# Patient Record
Sex: MALE | Race: WHITE | Employment: PART TIME | URBAN - METROPOLITAN AREA
[De-identification: names, ages, dates, MRNs, and addresses within clinical notes are randomized per-mention and may not be internally consistent; named-entity substitution may affect disease eponyms.]

---

## 2017-01-04 ENCOUNTER — OFFICE VISIT (OUTPATIENT)
Dept: FAMILY MEDICINE CLINIC | Age: 27
End: 2017-01-04

## 2017-01-04 VITALS
WEIGHT: 190 LBS | RESPIRATION RATE: 18 BRPM | TEMPERATURE: 98.2 F | SYSTOLIC BLOOD PRESSURE: 120 MMHG | HEIGHT: 73 IN | OXYGEN SATURATION: 100 % | DIASTOLIC BLOOD PRESSURE: 80 MMHG | BODY MASS INDEX: 25.18 KG/M2 | HEART RATE: 100 BPM

## 2017-01-04 DIAGNOSIS — F90.2 ADHD (ATTENTION DEFICIT HYPERACTIVITY DISORDER), COMBINED TYPE: ICD-10-CM

## 2017-01-04 DIAGNOSIS — Z51.81 MEDICATION MONITORING ENCOUNTER: ICD-10-CM

## 2017-01-04 DIAGNOSIS — R05.9 COUGH: Primary | ICD-10-CM

## 2017-01-04 RX ORDER — BENZONATATE 200 MG/1
200 CAPSULE ORAL
Qty: 30 CAP | Refills: 0 | Status: SHIPPED | OUTPATIENT
Start: 2017-01-04

## 2017-01-04 NOTE — MR AVS SNAPSHOT
Visit Information Date & Time Provider Department Dept. Phone Encounter #  
 1/4/2017  1:45 PM [address] [facility], [address] [phone] [id number] Follow-up Instructions Return in about 3 months (around 4/4/2017), or if symptoms worsen or fail to improve. Upcoming Health Maintenance Date Due Pneumococcal 19-64 Medium Risk (1 of 1 - PPSV23) 4/27/2011 DTaP/Tdap/Td series (2 - Td) 7/25/2016 INFLUENZA AGE 9 TO ADULT 8/1/2016 Allergies as of 1/4/2017  Review Complete On: 11/3/2016 By: Raphael Miranda NP No Known Allergies Current Immunizations  Never Reviewed Name Date Meningococcal (MCV4) Vaccine 10/14/2010 Tdap 7/25/2006 Not reviewed this visit You Were Diagnosed With   
  
 Codes Comments Cough    -  Primary ICD-10-CM: A36 ICD-9-CM: 786.2 ADHD (attention deficit hyperactivity disorder), combined type     ICD-10-CM: F90.2 ICD-9-CM: 314.01 Vitals BP Pulse Temp Resp Height(growth percentile) Weight(growth percentile) 120/80 100 98.2 °F (36.8 °C) (Oral) 18 6' 1\" (1.854 m) 190 lb (86.2 kg) SpO2 BMI Smoking Status 100% 25.07 kg/m2 Current Every Day Smoker Vitals History BMI and BSA Data Body Mass Index Body Surface Area 25.07 kg/m 2 2.11 m 2 Preferred Pharmacy Pharmacy Name Phone [facility] #[id number]- [place], [place] - [address] [facility] [address] [facility] [phone] Your Updated Medication List  
  
   
This list is accurate as of: 1/4/17  2:14 PM.  Always use your most recent med list.  
  
  
  
  
 benzonatate 200 mg capsule Commonly known as:  TESSALON Take 1 Cap by mouth three (3) times daily as needed for Cough. * lisdexamfetamine 30 mg capsule Commonly known as:  VYVANSE Take 1 Cap by mouth every morning. Max Daily Amount: 30 mg.  
  
 * lisdexamfetamine 30 mg capsule Commonly known as:  VYVANSE  
 Take 1 Cap by mouth every morning. Max Daily Amount: 30 mg. DO NOT FILL UNTIL 2/4/17 * lisdexamfetamine 30 mg capsule Commonly known as:  VYVANSE Take 1 Cap by mouth every morning. Max Daily Amount: 30 mg. DO NOT FILL UNTIL 3/4/17 * Notice: This list has 3 medication(s) that are the same as other medications prescribed for you. Read the directions carefully, and ask your doctor or other care provider to review them with you. Prescriptions Printed Refills  
 lisdexamfetamine (VYVANSE) 30 mg capsule 0 Sig: Take 1 Cap by mouth every morning. Max Daily Amount: 30 mg.  
 Class: Print Route: Oral  
 lisdexamfetamine (VYVANSE) 30 mg capsule 0 Sig: Take 1 Cap by mouth every morning. Max Daily Amount: 30 mg. DO NOT FILL UNTIL 2/4/17 Class: Print Route: Oral  
 lisdexamfetamine (VYVANSE) 30 mg capsule 0 Sig: Take 1 Cap by mouth every morning. Max Daily Amount: 30 mg. DO NOT FILL UNTIL 3/4/17 Class: Print Route: Oral  
  
Prescriptions Sent to Pharmacy Refills  
 benzonatate (TESSALON) 200 mg capsule 0 Sig: Take 1 Cap by mouth three (3) times daily as needed for Cough. Class: Normal  
 Pharmacy: [facility]/pharmacy #[id number] #: [phone] Route: Oral  
  
Follow-up Instructions Return in about 3 months (around 4/4/2017), or if symptoms worsen or fail to improve. Patient Instructions Attention Deficit Hyperactivity Disorder (ADHD) in Adults: Care Instructions Your Care Instructions Attention deficit hyperactivity disorder, or ADHD, is a condition that makes it hard to pay attention. So you may have problems when you try to focus, get organized, and finish tasks. It might make you more active than other people. Or you might do things without thinking first. 
ADHD is very common. It usually starts in early childhood.  Many adults don't realize they have it until their children are diagnosed. Then they become aware of their own symptoms. Doctors don't know what causes ADHD. But it often runs in families. ADHD can be treated with medicines, behavior training, and counseling. Treatment can improve your life. Follow-up care is a key part of your treatment and safety. Be sure to make and go to all appointments, and call your doctor if you are having problems. It's also a good idea to know your test results and keep a list of the medicines you take. How can you care for yourself at home? · Learn all you can about ADHD. This will help you and your family understand it better. · Take your medicines exactly as prescribed. Call your doctor if you think you are having a problem with your medicine. You will get more details on the specific medicines your doctor prescribes. · If you miss a dose of your medicine, do not take an extra dose. · If your doctor suggests counseling, find a counselor you like and trust. Talk openly and honestly. Be willing to make some changes. · Find a support group for adults with ADHD. Talking to others with the same problems can help you feel better. It can also give you ideas about how to best cope with the condition. · Get rid of distractions at your work space. Keep your desk clean. Try not to face a window or busy hallway. · Use files, planners, and other tools to keep you organized. · Limit use of alcohol, and do not use illegal drugs. People with ADHD tend to become addicted more easily than others. Tell your doctor if you need help to quit. Counseling, support groups, and sometimes medicines can help you stay free of alcohol or drugs. · Get at least 30 minutes of physical activity on most days of the week. Exercise has been shown to help people cope with ADHD. Walking is a good choice. You also may want to do other activities, such as running, swimming, cycling, or playing tennis or team sports. When should you call for help? Watch closely for changes in your health, and be sure to contact your doctor if: 
· You feel sad a lot or cry all the time. · You have trouble sleeping, or you sleep too much. · You find it hard to concentrate, make decisions, or remember things. · You change how you normally eat. · You feel guilty for no reason. Where can you learn more? Go to http://kaylan-lila.info/. Enter B196 in the search box to learn more about \"Attention Deficit Hyperactivity Disorder (ADHD) in Adults: Care Instructions. \" Current as of: July 26, 2016 Content Version: 11.1 © 8214-5705 [employer]. Care instructions adapted under license by [employer] (which disclaims liability or warranty for this information). If you have questions about a medical condition or this instruction, always ask your healthcare professional. [address] any warranty or liability for your use of this information. Introducing [facility]! Reynaldo Barton introduces weave energy patient portal. Now you can access parts of your medical record, email your doctor's office, and request medication refills online. 1. In your internet browser, go to [email or web address] 2. Click on the First Time User? Click Here link in the Sign In box. You will see the New Member Sign Up page. 3. Enter your weave energy Access Code exactly as it appears below. You will not need to use this code after youve completed the sign-up process. If you do not sign up before the expiration date, you must request a new code. · weave energy Access Code: [email or web address] Expires: 4/4/2017  2:14 PM 
 
4. Enter the last four digits of your Social Security Number (xxxx) and Date of Birth (mm/dd/yyyy) as indicated and click Submit. You will be taken to the next sign-up page. 5. Create a weave energy ID.  This will be your weave energy login ID and cannot be changed, so think of one that is secure and easy to remember. 6. Create a [facility] [employer] password. You can change your password at any time. 7. Enter your Password Reset Question and Answer. This can be used at a later time if you forget your password. 8. Enter your e-mail address. You will receive e-mail notification when new information is available in [address] 9. Click Sign Up. You can now view and download portions of your medical record. 10. Click the Download Summary menu link to download a portable copy of your medical information. If you have questions, please visit the Frequently Asked Questions section of the [employer] website. Remember, [employer] is NOT to be used for urgent needs. For medical emergencies, dial 911. Now available from your iPhone and Android! Please provide this summary of care documentation to your next provider. Your primary care clinician is listed as Cami Cota. If you have any questions after today's visit, please call [phone].

## 2017-01-04 NOTE — PROGRESS NOTES
Carlos Monreal is a 27 y.o. male   Chief Complaint   Patient presents with    Medication Refill    pt here for f/u for his vyvanse, pt is doing well with it. Pt states he is not having any side effects and feels much more on track. Pt also with a cold last week and states that he has a residual cough but is feeling better. Chief Complaint   Patient presents with    Medication Refill     he is a 27y.o. year old male who presents for evalution. Reviewed PmHx, RxHx, FmHx, SocHx, AllgHx and updated and dated in the chart. Review of Systems - negative except as listed above in the HPI    Objective:     Vitals:    01/04/17 1344   BP: 120/80   Pulse: 100   Resp: 18   Temp: 98.2 °F (36.8 °C)   TempSrc: Oral   SpO2: 100%   Weight: 190 lb (86.2 kg)   Height: 6' 1\" (1.854 m)       Current Outpatient Prescriptions   Medication Sig    lisdexamfetamine (VYVANSE) 30 mg capsule Take 1 Cap by mouth every morning. Max Daily Amount: 30 mg.    lisdexamfetamine (VYVANSE) 30 mg capsule Take 1 Cap by mouth every morning. Max Daily Amount: 30 mg. DO NOT FILL UNTIL 2/4/17    lisdexamfetamine (VYVANSE) 30 mg capsule Take 1 Cap by mouth every morning. Max Daily Amount: 30 mg. DO NOT FILL UNTIL 3/4/17    benzonatate (TESSALON) 200 mg capsule Take 1 Cap by mouth three (3) times daily as needed for Cough. No current facility-administered medications for this visit. Physical Examination: General appearance - alert, well appearing, and in no distress  Mental status - alert, oriented to person, place, and time  Eyes - pupils equal and reactive, extraocular eye movements intact  Chest - clear to auscultation, no wheezes, rales or rhonchi, symmetric air entry  Heart - normal rate, regular rhythm, normal S1, S2, no murmurs, rubs, clicks or gallops      Assessment/ Plan:   Luca Mcmanus was seen today for medication refill. Diagnoses and all orders for this visit:    Cough  -     benzonatate (TESSALON) 200 mg capsule;  Take 1 Cap by mouth three (3) times daily as needed for Cough. ADHD (attention deficit hyperactivity disorder), combined type  -     lisdexamfetamine (VYVANSE) 30 mg capsule; Take 1 Cap by mouth every morning. Max Daily Amount: 30 mg.  -     lisdexamfetamine (VYVANSE) 30 mg capsule; Take 1 Cap by mouth every morning. Max Daily Amount: 30 mg. DO NOT FILL UNTIL 2/4/17  -     lisdexamfetamine (VYVANSE) 30 mg capsule; Take 1 Cap by mouth every morning. Max Daily Amount: 30 mg. DO NOT FILL UNTIL 3/4/17    Medication monitoring encounter  -     COMPLIANCE DRUG SCREEN/PRESCRIPTION MONITORING       Follow-up Disposition:  Return in about 3 months (around 4/4/2017), or if symptoms worsen or fail to improve. I have discussed the diagnosis with the patient and the intended plan as seen in the above orders. The patient has received an after-visit summary and questions were answered concerning future plans. Pt conveyed understanding of plan.     Medication Side Effects and Warnings were discussed with patient      David Cortes, DO

## 2017-01-04 NOTE — PATIENT INSTRUCTIONS
Attention Deficit Hyperactivity Disorder (ADHD) in Adults: Care Instructions  Your Care Instructions  Attention deficit hyperactivity disorder, or ADHD, is a condition that makes it hard to pay attention. So you may have problems when you try to focus, get organized, and finish tasks. It might make you more active than other people. Or you might do things without thinking first.  ADHD is very common. It usually starts in early childhood. Many adults don't realize they have it until their children are diagnosed. Then they become aware of their own symptoms. Doctors don't know what causes ADHD. But it often runs in families. ADHD can be treated with medicines, behavior training, and counseling. Treatment can improve your life. Follow-up care is a key part of your treatment and safety. Be sure to make and go to all appointments, and call your doctor if you are having problems. It's also a good idea to know your test results and keep a list of the medicines you take. How can you care for yourself at home? · Learn all you can about ADHD. This will help you and your family understand it better. · Take your medicines exactly as prescribed. Call your doctor if you think you are having a problem with your medicine. You will get more details on the specific medicines your doctor prescribes. · If you miss a dose of your medicine, do not take an extra dose. · If your doctor suggests counseling, find a counselor you like and trust. Talk openly and honestly. Be willing to make some changes. · Find a support group for adults with ADHD. Talking to others with the same problems can help you feel better. It can also give you ideas about how to best cope with the condition. · Get rid of distractions at your work space. Keep your desk clean. Try not to face a window or busy hallway. · Use files, planners, and other tools to keep you organized. · Limit use of alcohol, and do not use illegal drugs.  People with ADHD tend to become addicted more easily than others. Tell your doctor if you need help to quit. Counseling, support groups, and sometimes medicines can help you stay free of alcohol or drugs. · Get at least 30 minutes of physical activity on most days of the week. Exercise has been shown to help people cope with ADHD. Walking is a good choice. You also may want to do other activities, such as running, swimming, cycling, or playing tennis or team sports. When should you call for help? Watch closely for changes in your health, and be sure to contact your doctor if:  · You feel sad a lot or cry all the time. · You have trouble sleeping, or you sleep too much. · You find it hard to concentrate, make decisions, or remember things. · You change how you normally eat. · You feel guilty for no reason. Where can you learn more? Go to http://kaylan-lila.info/. Enter B196 in the search box to learn more about \"Attention Deficit Hyperactivity Disorder (ADHD) in Adults: Care Instructions. \"  Current as of: July 26, 2016  Content Version: 11.1  © 5409-3431 [employer]. Care instructions adapted under license by [employer] (which disclaims liability or warranty for this information). If you have questions about a medical condition or this instruction, always ask your healthcare professional. [address] any warranty or liability for your use of this information.

## 2017-01-09 LAB — DRUGS UR: NORMAL

## 2017-01-09 NOTE — PROGRESS NOTES
Pt drug confirmation inappropriate. Pt pos for cocaine, xanax, and marijuana and negative for amphetamines. Remaining Rx's cancelled with pharmacy, spoke with pharmacist today. Pt will be terminated from practice please print letter. Attempted to call this afternoon with no answer.

## 2017-05-31 ENCOUNTER — ED HISTORICAL/CONVERTED ENCOUNTER (OUTPATIENT)
Dept: OTHER | Age: 27
End: 2017-05-31

## 2017-07-05 ENCOUNTER — ED HISTORICAL/CONVERTED ENCOUNTER (OUTPATIENT)
Dept: OTHER | Age: 27
End: 2017-07-05

## 2017-11-02 ENCOUNTER — ED HISTORICAL/CONVERTED ENCOUNTER (OUTPATIENT)
Dept: OTHER | Age: 27
End: 2017-11-02

## 2018-02-18 ENCOUNTER — ED HISTORICAL/CONVERTED ENCOUNTER (OUTPATIENT)
Dept: OTHER | Age: 28
End: 2018-02-18

## 2020-11-05 ENCOUNTER — HOSPITAL ENCOUNTER (EMERGENCY)
Age: 30
Discharge: HOME | End: 2020-11-06

## 2020-11-05 ENCOUNTER — APPOINTMENT (OUTPATIENT)
Dept: GENERAL RADIOLOGY | Age: 30
End: 2020-11-05
Attending: NURSE PRACTITIONER

## 2020-11-05 VITALS
RESPIRATION RATE: 16 BRPM | WEIGHT: 210 LBS | HEART RATE: 115 BPM | BODY MASS INDEX: 28.44 KG/M2 | OXYGEN SATURATION: 97 % | SYSTOLIC BLOOD PRESSURE: 131 MMHG | TEMPERATURE: 97.8 F | HEIGHT: 72 IN | DIASTOLIC BLOOD PRESSURE: 83 MMHG

## 2020-11-05 DIAGNOSIS — S61.012A LACERATION OF LEFT THUMB WITHOUT FOREIGN BODY WITHOUT DAMAGE TO NAIL, INITIAL ENCOUNTER: Primary | ICD-10-CM

## 2020-11-05 PROCEDURE — 74011000250 HC RX REV CODE- 250: Performed by: NURSE PRACTITIONER

## 2020-11-05 PROCEDURE — 99282 EMERGENCY DEPT VISIT SF MDM: CPT

## 2020-11-05 PROCEDURE — 90471 IMMUNIZATION ADMIN: CPT

## 2020-11-05 PROCEDURE — 75810000293 HC SIMP/SUPERF WND  RPR

## 2020-11-05 RX ORDER — LIDOCAINE HYDROCHLORIDE 10 MG/ML
15 INJECTION INFILTRATION; PERINEURAL ONCE
Status: COMPLETED | OUTPATIENT
Start: 2020-11-05 | End: 2020-11-05

## 2020-11-05 RX ADMIN — LIDOCAINE HYDROCHLORIDE 15 ML: 10 INJECTION, SOLUTION INFILTRATION; PERINEURAL at 22:50

## 2020-11-06 PROCEDURE — 75810000293 HC SIMP/SUPERF WND  RPR

## 2020-11-06 PROCEDURE — 90715 TDAP VACCINE 7 YRS/> IM: CPT | Performed by: NURSE PRACTITIONER

## 2020-11-06 PROCEDURE — 90471 IMMUNIZATION ADMIN: CPT

## 2020-11-06 PROCEDURE — 74011250636 HC RX REV CODE- 250/636: Performed by: NURSE PRACTITIONER

## 2020-11-06 RX ADMIN — TETANUS TOXOID, REDUCED DIPHTHERIA TOXOID AND ACELLULAR PERTUSSIS VACCINE, ADSORBED 0.5 ML: 5; 2.5; 8; 8; 2.5 SUSPENSION INTRAMUSCULAR at 00:14

## 2020-11-06 NOTE — DISCHARGE INSTRUCTIONS
Keep clean and dry, remove pressure dressing after 24hours, follow up with your PCP for suture removal in 10-14 days, watch for signs and symptoms of infection - fever, redness, swelling; take over the counter tylenol or ibuprofen as needed for pain
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
## 2020-11-06 NOTE — ED TRIAGE NOTES
Pt was fixing floor and fell and has a lac to left thumb pt still bleeding in triage applied gauze and pressure dressing.  Pt will need tetanus

## 2023-05-18 RX ORDER — BENZONATATE 200 MG/1
200 CAPSULE ORAL 3 TIMES DAILY PRN
COMMUNITY
Start: 2017-01-04

## 2024-04-24 ENCOUNTER — HOSPITAL ENCOUNTER (EMERGENCY)
Facility: HOSPITAL | Age: 34
Discharge: HOME | End: 2024-04-24
Attending: STUDENT IN AN ORGANIZED HEALTH CARE EDUCATION/TRAINING PROGRAM
Payer: COMMERCIAL

## 2024-04-24 VITALS
BODY MASS INDEX: 28.17 KG/M2 | OXYGEN SATURATION: 95 % | DIASTOLIC BLOOD PRESSURE: 96 MMHG | RESPIRATION RATE: 18 BRPM | HEART RATE: 90 BPM | HEIGHT: 72 IN | TEMPERATURE: 98.3 F | SYSTOLIC BLOOD PRESSURE: 144 MMHG | WEIGHT: 208 LBS

## 2024-04-24 DIAGNOSIS — I15.9 SECONDARY HYPERTENSION: Primary | ICD-10-CM

## 2024-04-24 DIAGNOSIS — F41.1 ANXIETY STATE: ICD-10-CM

## 2024-04-24 PROCEDURE — 93005 ELECTROCARDIOGRAM TRACING: CPT | Performed by: STUDENT IN AN ORGANIZED HEALTH CARE EDUCATION/TRAINING PROGRAM

## 2024-04-24 PROCEDURE — 99283 EMERGENCY DEPT VISIT LOW MDM: CPT

## 2024-04-24 PROCEDURE — 6370000000 HC RX 637 (ALT 250 FOR IP): Performed by: STUDENT IN AN ORGANIZED HEALTH CARE EDUCATION/TRAINING PROGRAM

## 2024-04-24 RX ORDER — HYDROXYZINE HYDROCHLORIDE 25 MG/1
25 TABLET, FILM COATED ORAL EVERY 8 HOURS PRN
Qty: 30 TABLET | Refills: 0 | Status: SHIPPED | OUTPATIENT
Start: 2024-04-24 | End: 2024-05-04

## 2024-04-24 RX ORDER — LORAZEPAM 0.5 MG/1
0.5 TABLET ORAL
Status: COMPLETED | OUTPATIENT
Start: 2024-04-24 | End: 2024-04-24

## 2024-04-24 RX ADMIN — LORAZEPAM 0.5 MG: 0.5 TABLET ORAL at 22:58

## 2024-04-24 ASSESSMENT — PAIN - FUNCTIONAL ASSESSMENT: PAIN_FUNCTIONAL_ASSESSMENT: 0-10

## 2024-04-24 ASSESSMENT — LIFESTYLE VARIABLES
HOW MANY STANDARD DRINKS CONTAINING ALCOHOL DO YOU HAVE ON A TYPICAL DAY: PATIENT DOES NOT DRINK
HOW OFTEN DO YOU HAVE A DRINK CONTAINING ALCOHOL: NEVER

## 2024-04-24 ASSESSMENT — PAIN SCALES - GENERAL: PAINLEVEL_OUTOF10: 0

## 2024-04-25 ENCOUNTER — NURSE TRIAGE (OUTPATIENT)
Dept: OTHER | Facility: CLINIC | Age: 34
End: 2024-04-25

## 2024-04-25 LAB
EKG ATRIAL RATE: 85 BPM
EKG DIAGNOSIS: NORMAL
EKG P AXIS: 59 DEGREES
EKG P-R INTERVAL: 146 MS
EKG Q-T INTERVAL: 352 MS
EKG QRS DURATION: 76 MS
EKG QTC CALCULATION (BAZETT): 418 MS
EKG R AXIS: 55 DEGREES
EKG T AXIS: 52 DEGREES
EKG VENTRICULAR RATE: 85 BPM

## 2024-04-25 NOTE — TELEPHONE ENCOUNTER
Location of patient: [place]    Received call from No One at [facility]; Patient with Red Flag Complaint requesting to establish care with where ever the sooner.    Subjective: Caller states \"follow up for ED, blood pressure. Wants follow within a week.\"     Current Symptoms: no worsening or new symptoms.        Recommended disposition:  Recommended within 1 week per ED discharge instructions    Patient First for now.     Care advice provided, patient verbalizes understanding; denies any other questions or concerns; instructed to call back for any new or worsening symptoms.    Patient/Caller agrees with recommended disposition; writer provided warm transfer to Mercedes at [facility] for appointment scheduling    Attention Provider:  Thank you for allowing me to participate in the care of your patient.  The patient was connected to triage in response to information provided to the [facility].  Please do not respond through this encounter as the response is not directed to a shared pool.        Reason for Disposition   Caller has already spoken with another triager and has no further questions    Protocols used: No Contact or Duplicate Contact Call-ADULT-[place]

## 2024-04-25 NOTE — ED TRIAGE NOTES
Pt states dizziness for about a week and a half accompanied by indigestion type pain in his chest. States checked BP prior to coming and was elevated at home, denies any hx of HTN.

## 2024-04-25 NOTE — ED PROVIDER NOTES
Smokeless tobacco: Current     Types: Snuff   Substance Use Topics    Alcohol use: Yes     Alcohol/week: 0.8 standard drinks of alcohol    Drug use: Yes     Types: Marijuana (Weed), Opiates      Allergies:  No Known Allergies    Review of Systems     Positives and pertinent negatives as per HPI, otherwise negative ROS.    Physical Exam and Vital Signs   Vital Signs - Reviewed the patient's vital signs.    Vitals:    04/24/24 2210 04/24/24 2257   BP: (!) 148/113 (!) 144/96   Pulse: 88 90   Resp: 20 18   Temp: 98.3 °F (36.8 °C)    TempSrc: Oral    SpO2: 95% 95%   Weight: 94.3 kg (208 lb)    Height: 1.829 m (6')      Physical Exam:    GENERAL: awake, alert, cooperative, not in distress  HEENT:  * Pupils equal, EOMI  * Head atraumatic  CV:  * audible heart sounds  * warm and perfused extremities bilaterally  PULMONARY: Good air movement, no wheezes, no crackles  ABDOMEN/: soft, no distension, no guarding, no abdominal tenderness, sexton negative, Mcburney negative, Rovsig negative, no masses, no CVA tenderness.  EXTREMITIES/BACK: warm and perfused, no tenderness, no edema, no signs of DVT (warmth, asymmetric swelling, erythema, or calf tenderness).  SKIN: no rashes or signs of trauma  NEURO:   * GCS = 15 (E=4, V=5, M=6)  * Awake, alert, oriented x 3, normal speech  * CNs II-XII intact  * Strength 5/5 in all extremities  * Sensory exam grossly normal  * No pronator drift or dysmetria  * Gaze conjugate, no nystagmus  * Gait normal without instability  * NIHSS 0    Medical Decision Making     Patient is a 33 y.o. male presenting for elevated blood pressure. Vitals reveal BP 140s systolic, no significant abnormalities and physical exam reveals no significant abnormalities. EKG showed no significant abnormalities. Based on the history, physical exam, risk factors, and vital signs, differential includes: Asymptomatic hypertension.  No concern for ICH as the patient does not have any headaches or neurologic symptoms or

## 2024-04-26 ENCOUNTER — OFFICE VISIT (OUTPATIENT)
Facility: CLINIC | Age: 34
End: 2024-04-26
Payer: COMMERCIAL

## 2024-04-26 VITALS
HEART RATE: 88 BPM | HEIGHT: 72 IN | TEMPERATURE: 98 F | SYSTOLIC BLOOD PRESSURE: 144 MMHG | BODY MASS INDEX: 28.63 KG/M2 | RESPIRATION RATE: 16 BRPM | DIASTOLIC BLOOD PRESSURE: 96 MMHG | OXYGEN SATURATION: 97 % | WEIGHT: 211.4 LBS

## 2024-04-26 DIAGNOSIS — F43.29 STRESS AND ADJUSTMENT REACTION: ICD-10-CM

## 2024-04-26 DIAGNOSIS — Z00.00 NORMAL ROUTINE PHYSICAL EXAMINATION: ICD-10-CM

## 2024-04-26 DIAGNOSIS — Z00.00 ENCOUNTER FOR MEDICAL EXAMINATION TO ESTABLISH CARE: Primary | ICD-10-CM

## 2024-04-26 DIAGNOSIS — F40.11 GENERALIZED SOCIAL PHOBIA: ICD-10-CM

## 2024-04-26 DIAGNOSIS — I10 ESSENTIAL HYPERTENSION: ICD-10-CM

## 2024-04-26 DIAGNOSIS — F41.0 PANIC DISORDER (EPISODIC PAROXYSMAL ANXIETY): ICD-10-CM

## 2024-04-26 LAB
ALBUMIN SERPL-MCNC: 4.9 G/DL (ref 3.5–5)
ALBUMIN/GLOB SERPL: 1.6 (ref 1.1–2.2)
ALP SERPL-CCNC: 96 U/L (ref 45–117)
ALT SERPL-CCNC: 37 U/L (ref 12–78)
ANION GAP SERPL CALC-SCNC: 5 MMOL/L (ref 5–15)
APPEARANCE UR: CLEAR
AST SERPL-CCNC: 20 U/L (ref 15–37)
BILIRUB SERPL-MCNC: 0.8 MG/DL (ref 0.2–1)
BILIRUB UR QL: NEGATIVE
BUN SERPL-MCNC: 12 MG/DL (ref 6–20)
BUN/CREAT SERPL: 13 (ref 12–20)
CALCIUM SERPL-MCNC: 9.9 MG/DL (ref 8.5–10.1)
CHLORIDE SERPL-SCNC: 104 MMOL/L (ref 97–108)
CHOLEST SERPL-MCNC: 231 MG/DL
CO2 SERPL-SCNC: 30 MMOL/L (ref 21–32)
COLOR UR: NORMAL
CREAT SERPL-MCNC: 0.95 MG/DL (ref 0.7–1.3)
ERYTHROCYTE [DISTWIDTH] IN BLOOD BY AUTOMATED COUNT: 12.8 % (ref 11.5–14.5)
GLOBULIN SER CALC-MCNC: 3.1 G/DL (ref 2–4)
GLUCOSE SERPL-MCNC: 95 MG/DL (ref 65–100)
GLUCOSE UR STRIP.AUTO-MCNC: NEGATIVE MG/DL
HCT VFR BLD AUTO: 49.6 % (ref 36.6–50.3)
HDLC SERPL-MCNC: 56 MG/DL
HDLC SERPL: 4.1 (ref 0–5)
HGB BLD-MCNC: 16.1 G/DL (ref 12.1–17)
HGB UR QL STRIP: NEGATIVE
KETONES UR QL STRIP.AUTO: NEGATIVE MG/DL
LDLC SERPL CALC-MCNC: 157.2 MG/DL (ref 0–100)
LEUKOCYTE ESTERASE UR QL STRIP.AUTO: NEGATIVE
MCH RBC QN AUTO: 28.9 PG (ref 26–34)
MCHC RBC AUTO-ENTMCNC: 32.5 G/DL (ref 30–36.5)
MCV RBC AUTO: 88.9 FL (ref 80–99)
NITRITE UR QL STRIP.AUTO: NEGATIVE
NRBC # BLD: 0 K/UL (ref 0–0.01)
NRBC BLD-RTO: 0 PER 100 WBC
PH UR STRIP: 6.5 (ref 5–8)
PLATELET # BLD AUTO: 362 K/UL (ref 150–400)
PMV BLD AUTO: 9.7 FL (ref 8.9–12.9)
POTASSIUM SERPL-SCNC: 4.1 MMOL/L (ref 3.5–5.1)
PROT SERPL-MCNC: 8 G/DL (ref 6.4–8.2)
PROT UR STRIP-MCNC: NEGATIVE MG/DL
RBC # BLD AUTO: 5.58 M/UL (ref 4.1–5.7)
SODIUM SERPL-SCNC: 139 MMOL/L (ref 136–145)
SP GR UR REFRACTOMETRY: 1.02 (ref 1–1.03)
TRIGL SERPL-MCNC: 89 MG/DL
TSH SERPL DL<=0.05 MIU/L-ACNC: 1.22 UIU/ML (ref 0.36–3.74)
UROBILINOGEN UR QL STRIP.AUTO: 0.2 EU/DL (ref 0.2–1)
VLDLC SERPL CALC-MCNC: 17.8 MG/DL
WBC # BLD AUTO: 6.9 K/UL (ref 4.1–11.1)

## 2024-04-26 PROCEDURE — 3074F SYST BP LT 130 MM HG: CPT | Performed by: NURSE PRACTITIONER

## 2024-04-26 PROCEDURE — 3080F DIAST BP >= 90 MM HG: CPT | Performed by: NURSE PRACTITIONER

## 2024-04-26 PROCEDURE — 99385 PREV VISIT NEW AGE 18-39: CPT | Performed by: NURSE PRACTITIONER

## 2024-04-26 RX ORDER — ESCITALOPRAM OXALATE 5 MG/1
5 TABLET ORAL DAILY
Qty: 90 TABLET | Refills: 0 | Status: SHIPPED | OUTPATIENT
Start: 2024-04-26

## 2024-04-26 RX ORDER — METOPROLOL SUCCINATE 50 MG/1
50 TABLET, EXTENDED RELEASE ORAL DAILY
Qty: 90 TABLET | Refills: 0 | Status: SHIPPED | OUTPATIENT
Start: 2024-04-26

## 2024-04-26 SDOH — ECONOMIC STABILITY: HOUSING INSECURITY
IN THE LAST 12 MONTHS, WAS THERE A TIME WHEN YOU DID NOT HAVE A STEADY PLACE TO SLEEP OR SLEPT IN A SHELTER (INCLUDING NOW)?: NO

## 2024-04-26 SDOH — ECONOMIC STABILITY: FOOD INSECURITY: WITHIN THE PAST 12 MONTHS, YOU WORRIED THAT YOUR FOOD WOULD RUN OUT BEFORE YOU GOT MONEY TO BUY MORE.: NEVER TRUE

## 2024-04-26 SDOH — ECONOMIC STABILITY: FOOD INSECURITY: WITHIN THE PAST 12 MONTHS, THE FOOD YOU BOUGHT JUST DIDN'T LAST AND YOU DIDN'T HAVE MONEY TO GET MORE.: NEVER TRUE

## 2024-04-26 SDOH — HEALTH STABILITY: PHYSICAL HEALTH: ON AVERAGE, HOW MANY DAYS PER WEEK DO YOU ENGAGE IN MODERATE TO STRENUOUS EXERCISE (LIKE A BRISK WALK)?: 5 DAYS

## 2024-04-26 SDOH — ECONOMIC STABILITY: INCOME INSECURITY: HOW HARD IS IT FOR YOU TO PAY FOR THE VERY BASICS LIKE FOOD, HOUSING, MEDICAL CARE, AND HEATING?: NOT HARD AT ALL

## 2024-04-26 SDOH — HEALTH STABILITY: PHYSICAL HEALTH: ON AVERAGE, HOW MANY MINUTES DO YOU ENGAGE IN EXERCISE AT THIS LEVEL?: 150+ MIN

## 2024-04-26 ASSESSMENT — PATIENT HEALTH QUESTIONNAIRE - PHQ9
SUM OF ALL RESPONSES TO PHQ QUESTIONS 1-9: 0
SUM OF ALL RESPONSES TO PHQ QUESTIONS 1-9: 0
1. LITTLE INTEREST OR PLEASURE IN DOING THINGS: NOT AT ALL
SUM OF ALL RESPONSES TO PHQ9 QUESTIONS 1 & 2: 0
SUM OF ALL RESPONSES TO PHQ QUESTIONS 1-9: 0
2. FEELING DOWN, DEPRESSED OR HOPELESS: NOT AT ALL
SUM OF ALL RESPONSES TO PHQ QUESTIONS 1-9: 0

## 2024-04-26 NOTE — PROGRESS NOTES
Lefty Johnson is a 33 y.o. male     Chief Complaint   Patient presents with    Annual Exam     New Patient        BP (!) 128/94 (Site: Left Upper Arm, Position: Sitting, Cuff Size: Medium Adult)   Pulse 88   Temp 98 °F (36.7 °C) (Oral)   Resp 16   Ht 1.829 m (6')   Wt 95.9 kg (211 lb 6.4 oz)   SpO2 97%   BMI 28.67 kg/m²     Health Maintenance Due   Topic Date Due    Hepatitis B vaccine (1 of 3 - 3-dose series) Never done    COVID-19 Vaccine (1) Never done    Varicella vaccine (1 of 2 - 2-dose childhood series) Never done    Depression Screen  Never done    HIV screen  Never done    Hepatitis C screen  Never done         \"Have you been to the ER, urgent care clinic since your last visit?  Hospitalized since your last visit?\"    YES - When: approximately 2 days ago.  Where and Why: [facility] for elevated BP.    “Have you seen or consulted any other health care providers outside of [facility] since your last visit?”    NO

## 2024-04-26 NOTE — PROGRESS NOTES
Chief Complaint   Patient presents with    Annual Exam     New Patient        SUBJECTIVE:    Lefty Johnson is a 33 y.o. male who is new to me, and here today for a routine physical exam as well as to discuss concerns regarding elevated blood pressure as well as anxiety issues.    On a few occasions, the patient was found to have an elevated blood pressure.  He states he was seen in the emergency room on 04/24/2024 with complaints of dizziness, reflux, and sensation of flushing/elevated blood pressure.  He states that he had no labs or imaging studies while there.  His EKG was unremarkable.  He was told to follow-up with a PCP to discuss blood pressure management.  He denies any chest pain, chest pressure, blurred vision, palpitations, or syncope episodes.  He does admit to having an increased amount of stress due to recent addition of baby and their family household.  He feels that his blood pressure has likely been elevated for some time now.  He also had a brother passed away recently due to drug overdose.  He states this has caused a lot of stress for him as well.    Additionally, he feels that he has a continued problem with his anxiety and has occasional episodes of panic as well.  He states these occur several times a day, and has a hard time going out into public places with people he is unfamiliar with.  He is not presently taking any medication for this, but was given some hydroxyzine from the emergency room which she states is quite sedating and does not feel that it is helpful.      Current Outpatient Medications   Medication Sig Dispense Refill    metoprolol succinate (TOPROL XL) 50 MG extended release tablet Take 1 tablet by mouth daily 90 tablet 0    escitalopram (LEXAPRO) 5 MG tablet Take 1 tablet by mouth daily 90 tablet 0    hydrOXYzine HCl (ATARAX) 25 MG tablet Take 1 tablet by mouth every 8 hours as needed for Itching (Patient not taking: Reported on 4/26/2024) 30 tablet 0    benzonatate

## 2024-08-05 ENCOUNTER — OFFICE VISIT (OUTPATIENT)
Facility: CLINIC | Age: 34
End: 2024-08-05
Payer: COMMERCIAL

## 2024-08-05 VITALS
DIASTOLIC BLOOD PRESSURE: 76 MMHG | TEMPERATURE: 98.2 F | HEART RATE: 95 BPM | OXYGEN SATURATION: 98 % | WEIGHT: 213.8 LBS | SYSTOLIC BLOOD PRESSURE: 122 MMHG | BODY MASS INDEX: 28.96 KG/M2 | HEIGHT: 72 IN | RESPIRATION RATE: 16 BRPM

## 2024-08-05 DIAGNOSIS — R06.2 WHEEZING: ICD-10-CM

## 2024-08-05 DIAGNOSIS — J20.9 ACUTE BRONCHITIS, UNSPECIFIED ORGANISM: Primary | ICD-10-CM

## 2024-08-05 DIAGNOSIS — J01.90 ACUTE SINUSITIS, RECURRENCE NOT SPECIFIED, UNSPECIFIED LOCATION: ICD-10-CM

## 2024-08-05 DIAGNOSIS — R05.8 PRODUCTIVE COUGH: ICD-10-CM

## 2024-08-05 PROCEDURE — 99213 OFFICE O/P EST LOW 20 MIN: CPT | Performed by: NURSE PRACTITIONER

## 2024-08-05 RX ORDER — ALBUTEROL SULFATE 90 UG/1
2 AEROSOL, METERED RESPIRATORY (INHALATION) 4 TIMES DAILY PRN
Qty: 18 G | Refills: 0 | Status: SHIPPED | OUTPATIENT
Start: 2024-08-05

## 2024-08-05 RX ORDER — PREDNISONE 10 MG/1
10 TABLET ORAL SEE ADMIN INSTRUCTIONS
Qty: 1 EACH | Refills: 0 | Status: CANCELLED | OUTPATIENT
Start: 2024-08-05

## 2024-08-05 RX ORDER — AZITHROMYCIN 250 MG/1
250 TABLET, FILM COATED ORAL SEE ADMIN INSTRUCTIONS
Qty: 6 TABLET | Refills: 0 | Status: CANCELLED | OUTPATIENT
Start: 2024-08-05 | End: 2024-08-10

## 2024-08-05 RX ORDER — AMOXICILLIN AND CLAVULANATE POTASSIUM 875; 125 MG/1; MG/1
1 TABLET, FILM COATED ORAL 2 TIMES DAILY
Qty: 20 TABLET | Refills: 0 | Status: SHIPPED | OUTPATIENT
Start: 2024-08-05 | End: 2024-08-09

## 2024-08-05 RX ORDER — PREDNISONE 20 MG/1
TABLET ORAL
Qty: 20 TABLET | Refills: 0 | Status: SHIPPED | OUTPATIENT
Start: 2024-08-05 | End: 2024-08-09

## 2024-08-05 NOTE — PROGRESS NOTES
Chief Complaint   Patient presents with    URI     Cough, congestion, green mucous, rib pain from coughing started last week Thursday       SUBJECTIVE:    Lefty Johnson is a 34 y.o. male who is here today with complaints of worsening cough, nasal congestion with green sputum production and green nasal discharge and generalized rib pain due to coughing.  He has been using some over-the-counter remedies including DayQuil, NyQuil, and Mucinex, but without a significant relief.  He does endorse wheezing and dyspnea on exertion, but no shortness of breath at rest.  He has had no fever.  He tested negative for COVID today.      Current Outpatient Medications   Medication Sig Dispense Refill    amoxicillin-clavulanate (AUGMENTIN) 875-125 MG per tablet Take 1 tablet by mouth 2 times daily for 10 days 20 tablet 0    predniSONE (DELTASONE) 20 MG tablet Take 3 tablets by mouth daily for 3 days, then 2 tablets by mouth daily for 3 days, then 1 tablet by mouth daily for 5 days. 20 tablet 0    albuterol sulfate HFA (VENTOLIN HFA) 108 (90 Base) MCG/ACT inhaler Inhale 2 puffs into the lungs 4 times daily as needed for Wheezing 18 g 0    metoprolol succinate (TOPROL XL) 50 MG extended release tablet Take 1 tablet by mouth daily 90 tablet 0    escitalopram (LEXAPRO) 5 MG tablet Take 1 tablet by mouth daily 90 tablet 0     No current facility-administered medications for this visit.     Past Medical History:   Diagnosis Date    Asthma     Bipolar 2 disorder (HCC)     Kidney stones     Psychiatric disorder     Bipolar and anxiety    Psychiatric disorder     ADD     Past Surgical History:   Procedure Laterality Date    HEENT 2009    wisdom teeth extracted    HERNIA REPAIR       No Known Allergies    REVIEW OF SYSTEMS:                                        POSITIVE= bold text  Negative = regular text    General:                     fever, chills, sweats, generalized weakness, weight loss/gain,

## 2024-08-05 NOTE — PROGRESS NOTES
Lefty Johnson is a 34 y.o. male     Chief Complaint   Patient presents with    URI     Cough, congestion, green mucous, rib pain from coughing started last week Thursday       /76 (Site: Left Upper Arm, Position: Sitting, Cuff Size: Medium Adult)   Pulse 95   Temp 98.2 °F (36.8 °C) (Oral)   Resp 16   Ht 1.829 m (6')   Wt 97 kg (213 lb 12.8 oz)   SpO2 98%   BMI 29.00 kg/m²     Health Maintenance Due   Topic Date Due    Hepatitis B vaccine (1 of 3 - 3-dose series) Never done    COVID-19 Vaccine (1) Never done    Varicella vaccine (1 of 2 - 2-dose childhood series) Never done    HIV screen  Never done    Hepatitis C screen  Never done    Flu vaccine (1) Never done         \"Have you been to the ER, urgent care clinic since your last visit?  Hospitalized since your last visit?\"    NO    “Have you seen or consulted any other health care providers outside of [facility] since your last visit?”    NO

## 2024-08-09 ENCOUNTER — OFFICE VISIT (OUTPATIENT)
Facility: CLINIC | Age: 34
End: 2024-08-09
Payer: COMMERCIAL

## 2024-08-09 VITALS
RESPIRATION RATE: 18 BRPM | SYSTOLIC BLOOD PRESSURE: 120 MMHG | BODY MASS INDEX: 29.64 KG/M2 | TEMPERATURE: 98 F | DIASTOLIC BLOOD PRESSURE: 80 MMHG | HEART RATE: 105 BPM | HEIGHT: 72 IN | WEIGHT: 218.8 LBS | OXYGEN SATURATION: 95 %

## 2024-08-09 DIAGNOSIS — R06.2 WHEEZING: ICD-10-CM

## 2024-08-09 DIAGNOSIS — F40.11 GENERALIZED SOCIAL PHOBIA: ICD-10-CM

## 2024-08-09 DIAGNOSIS — F41.0 PANIC DISORDER (EPISODIC PAROXYSMAL ANXIETY): ICD-10-CM

## 2024-08-09 DIAGNOSIS — I10 ESSENTIAL HYPERTENSION: ICD-10-CM

## 2024-08-09 DIAGNOSIS — F33.41 RECURRENT MAJOR DEPRESSIVE DISORDER, IN PARTIAL REMISSION (HCC): Primary | ICD-10-CM

## 2024-08-09 DIAGNOSIS — R00.0 TACHYCARDIA: ICD-10-CM

## 2024-08-09 PROCEDURE — 3074F SYST BP LT 130 MM HG: CPT | Performed by: NURSE PRACTITIONER

## 2024-08-09 PROCEDURE — 99214 OFFICE O/P EST MOD 30 MIN: CPT | Performed by: NURSE PRACTITIONER

## 2024-08-09 PROCEDURE — 3079F DIAST BP 80-89 MM HG: CPT | Performed by: NURSE PRACTITIONER

## 2024-08-09 RX ORDER — CLONAZEPAM 0.5 MG/1
0.5 TABLET ORAL 2 TIMES DAILY PRN
Qty: 60 TABLET | Refills: 2 | Status: SHIPPED | OUTPATIENT
Start: 2024-08-09 | End: 2024-11-07

## 2024-08-09 NOTE — PROGRESS NOTES
Chief Complaint   Patient presents with    6 week f/u       SUBJECTIVE:    Lefty Johnson is a 34 y.o. male who is here today for a follow up appointment regarding his depression and anxiety.    On 04/26/24, the patient was seen in my office with complaints of ongoing symptoms of social anxiety, depression, and panic attacks.  As a result, the patient was started on escitalopram 5 mg daily.  He states he has been taking the medication as prescribed and denies any adverse side effects.  He feels the medication has been effective overall.  He feels his depression is well-controlled in general, and his anxiety is somewhat better.  However, he continues to have episodes of moderate to severe anxiety attacks that are quite bothersome.  He states that he had been on clonazepam in the past which was helpful.    Patient also remains on metoprolol 50 mg daily for management of his hypertension and tachycardia.  He continues to respond well to the medication.  His blood pressure is noticeably improved today.  He denies any recent episodes of chest pain, chest pressure, headaches, dizziness, blurred vision, palpitations, or syncope episodes.    The patient continues to recover from recent episode of acute bronchitis.  He states he is feeling much better now, but continues to wheeze off and on and have a cough that is occasionally productive.  He denies any fever or severe shortness of breath.    Current Outpatient Medications   Medication Sig Dispense Refill    clonazePAM (KLONOPIN) 0.5 MG tablet Take 1 tablet by mouth 2 times daily as needed for Anxiety for up to 90 days. Max Daily Amount: 1 mg 60 tablet 2    albuterol sulfate HFA (VENTOLIN HFA) 108 (90 Base) MCG/ACT inhaler Inhale 2 puffs into the lungs 4 times daily as needed for Wheezing 18 g 0    metoprolol succinate (TOPROL XL) 50 MG extended release tablet Take 1 tablet by mouth daily 90 tablet 0    escitalopram (LEXAPRO) 5 MG tablet Take 1 tablet by mouth daily 90

## 2024-08-09 NOTE — PROGRESS NOTES
Lefty Johnson is a 34 y.o. male     Chief Complaint   Patient presents with    6 week f/u       /80 (Site: Left Upper Arm, Position: Sitting, Cuff Size: Large Adult)   Pulse (!) 105   Temp 98 °F (36.7 °C) (Oral)   Resp 18   Ht 1.829 m (6')   Wt 99.2 kg (218 lb 12.8 oz)   SpO2 95%   BMI 29.67 kg/m²     Health Maintenance Due   Topic Date Due    Hepatitis B vaccine (1 of 3 - 3-dose series) Never done    COVID-19 Vaccine (1) Never done    Varicella vaccine (1 of 2 - 2-dose childhood series) Never done    HIV screen  Never done    Hepatitis C screen  Never done    Flu vaccine (1) Never done         \"Have you been to the ER, urgent care clinic since your last visit?  Hospitalized since your last visit?\"    NO    “Have you seen or consulted any other health care providers outside of [facility] since your last visit?”    NO

## 2024-09-10 DIAGNOSIS — F40.11 GENERALIZED SOCIAL PHOBIA: ICD-10-CM

## 2024-09-10 DIAGNOSIS — F41.0 PANIC DISORDER (EPISODIC PAROXYSMAL ANXIETY): ICD-10-CM

## 2024-09-10 DIAGNOSIS — I10 ESSENTIAL HYPERTENSION: ICD-10-CM

## 2024-09-10 RX ORDER — METOPROLOL SUCCINATE 50 MG/1
50 TABLET, EXTENDED RELEASE ORAL DAILY
Qty: 90 TABLET | Refills: 0 | Status: SHIPPED | OUTPATIENT
Start: 2024-09-10

## 2024-09-10 RX ORDER — ESCITALOPRAM OXALATE 5 MG/1
5 TABLET ORAL DAILY
Qty: 90 TABLET | Refills: 0 | Status: SHIPPED | OUTPATIENT
Start: 2024-09-10

## 2024-11-03 DIAGNOSIS — J20.9 ACUTE BRONCHITIS, UNSPECIFIED ORGANISM: ICD-10-CM

## 2024-11-03 DIAGNOSIS — F41.0 PANIC DISORDER (EPISODIC PAROXYSMAL ANXIETY): ICD-10-CM

## 2024-11-03 DIAGNOSIS — R06.2 WHEEZING: ICD-10-CM

## 2024-11-04 RX ORDER — CLONAZEPAM 0.5 MG/1
TABLET ORAL
Qty: 60 TABLET | Refills: 2 | Status: SHIPPED | OUTPATIENT
Start: 2024-11-04 | End: 2025-02-02

## 2024-11-04 RX ORDER — ALBUTEROL SULFATE 90 UG/1
2 INHALANT RESPIRATORY (INHALATION) 4 TIMES DAILY PRN
Qty: 18 EACH | Refills: 1 | Status: SHIPPED | OUTPATIENT
Start: 2024-11-04

## 2024-11-04 NOTE — TELEPHONE ENCOUNTER
PCP: Tyler Ortiz APRN - NP    Last appt: 8/9/2024    Future Appointments   Date Time Provider Department Center   11/11/2024  2:00 PM Tyler Ortiz APRN - NP [facility] DEP       Requested Prescriptions     Pending Prescriptions Disp Refills    albuterol sulfate HFA (PROVENTIL;VENTOLIN;PROAIR) 108 (90 Base) MCG/ACT inhaler [Pharmacy Med Name: ALBUTEROL HFA (VENTOLIN) INH] 18 each 1     Sig: INHALE 2 PUFFS INTO THE LUNGS 4 TIMES DAILY AS NEEDED FOR WHEEZING.    clonazePAM (KLONOPIN) 0.5 MG tablet [Pharmacy Med Name: CLONAZEPAM 0.5 MG TABLET] 60 tablet 2     Sig: TAKE 1 TABLET BY MOUTH 2 TIMES DAILY AS NEEDED FOR ANXIETY FOR UP TO 90 DAYS.

## 2024-11-11 ENCOUNTER — OFFICE VISIT (OUTPATIENT)
Facility: CLINIC | Age: 34
End: 2024-11-11
Payer: COMMERCIAL

## 2024-11-11 VITALS
OXYGEN SATURATION: 96 % | WEIGHT: 219 LBS | BODY MASS INDEX: 29.66 KG/M2 | TEMPERATURE: 98.4 F | HEART RATE: 79 BPM | DIASTOLIC BLOOD PRESSURE: 82 MMHG | SYSTOLIC BLOOD PRESSURE: 126 MMHG | HEIGHT: 72 IN | RESPIRATION RATE: 16 BRPM

## 2024-11-11 DIAGNOSIS — I10 ESSENTIAL HYPERTENSION: ICD-10-CM

## 2024-11-11 DIAGNOSIS — F40.11 GENERALIZED SOCIAL PHOBIA: ICD-10-CM

## 2024-11-11 DIAGNOSIS — F33.41 RECURRENT MAJOR DEPRESSIVE DISORDER, IN PARTIAL REMISSION (HCC): Primary | ICD-10-CM

## 2024-11-11 DIAGNOSIS — F41.0 PANIC DISORDER (EPISODIC PAROXYSMAL ANXIETY): ICD-10-CM

## 2024-11-11 PROCEDURE — 3074F SYST BP LT 130 MM HG: CPT | Performed by: NURSE PRACTITIONER

## 2024-11-11 PROCEDURE — 3079F DIAST BP 80-89 MM HG: CPT | Performed by: NURSE PRACTITIONER

## 2024-11-11 PROCEDURE — 99214 OFFICE O/P EST MOD 30 MIN: CPT | Performed by: NURSE PRACTITIONER

## 2024-11-11 NOTE — PROGRESS NOTES
Lefty Johnson is a 34 y.o. male     Chief Complaint   Patient presents with    Hypertension     3M       /82 (Site: Left Upper Arm, Position: Sitting, Cuff Size: Medium Adult)   Pulse 79   Temp 98.4 °F (36.9 °C) (Oral)   Resp 16   Ht 1.829 m (6')   Wt 99.3 kg (219 lb)   SpO2 96%   BMI 29.70 kg/m²     Health Maintenance Due   Topic Date Due    Varicella vaccine (1 of 2 - 13+ 2-dose series) Never done    Hepatitis B vaccine (1 of 3 - 19+ 3-dose series) Never done    Flu vaccine (1) Never done    COVID-19 Vaccine (1 - 2023-24 season) Never done         \"Have you been to the ER, urgent care clinic since your last visit?  Hospitalized since your last visit?\"    NO    “Have you seen or consulted any other health care providers outside of [facility] since your last visit?”    NO

## 2024-11-11 NOTE — PROGRESS NOTES
Chief Complaint   Patient presents with    Hypertension     3M       SUBJECTIVE:    Lefty Johnson is a 34 y.o. male who is here today for a follow up appointment regarding current medical conditions including: Major depression, panic disorder, generalized social phobia, and essential hypertension.    The patient feels that he is doing \"much better overall.\"  He is currently on escitalopram daily as well as clonazepam twice daily as needed, and feels that his symptoms of depression and anxiety have been helped considerably.  He denies any adverse side effects of the medication.  He states the medication helped to make him \"feel like a normal.\"  He is very happy with the results, and has been able to engage in social outings without feeling overly anxious.    He remains on metoprolol succinate 50 mg daily for his hypertension.  His blood pressure continues to be within normal limits.  He denies any adverse side effects of the medication.  Additionally, he denies any recent episodes of chest pain, chest pressure, shortness of breath, headaches, dizziness, blurred vision, palpitations, or syncope episodes.      Current Outpatient Medications   Medication Sig Dispense Refill    albuterol sulfate HFA (PROVENTIL;VENTOLIN;PROAIR) 108 (90 Base) MCG/ACT inhaler INHALE 2 PUFFS INTO THE LUNGS 4 TIMES DAILY AS NEEDED FOR WHEEZING. 18 each 1    clonazePAM (KLONOPIN) 0.5 MG tablet TAKE 1 TABLET BY MOUTH 2 TIMES DAILY AS NEEDED FOR ANXIETY FOR UP TO 90 DAYS. 60 tablet 2    metoprolol succinate (TOPROL XL) 50 MG extended release tablet TAKE 1 TABLET BY MOUTH EVERY DAY 90 tablet 0    escitalopram (LEXAPRO) 5 MG tablet TAKE 1 TABLET BY MOUTH EVERY DAY 90 tablet 0     No current facility-administered medications for this visit.     Past Medical History:   Diagnosis Date    Asthma     Bipolar 2 disorder (HCC)     Kidney stones     Psychiatric disorder     Bipolar and anxiety    Psychiatric disorder     ADD     Past Surgical

## 2024-12-01 DIAGNOSIS — I10 ESSENTIAL HYPERTENSION: ICD-10-CM

## 2024-12-02 RX ORDER — METOPROLOL SUCCINATE 50 MG/1
50 TABLET, EXTENDED RELEASE ORAL DAILY
Qty: 90 TABLET | Refills: 1 | Status: SHIPPED | OUTPATIENT
Start: 2024-12-02

## 2024-12-02 NOTE — TELEPHONE ENCOUNTER
PCP: Tyler Ortiz APRN - NP    Last appt: 11/11/2024    Future Appointments   Date Time Provider Department Center   5/14/2025  8:00 AM Tyler [facility]iz [facility] - [facility]       Requested Prescriptions     Pending Prescriptions Disp Refills    metoprolol succinate (TOPROL XL) 50 MG extended release tablet [Pharmacy Med Name: METOPROLOL SUCC ER 50 MG TAB] 90 tablet 1     Sig: TAKE 1 TABLET BY MOUTH EVERY DAY

## 2024-12-13 DIAGNOSIS — F41.0 PANIC DISORDER (EPISODIC PAROXYSMAL ANXIETY): ICD-10-CM

## 2024-12-13 DIAGNOSIS — F40.11 GENERALIZED SOCIAL PHOBIA: ICD-10-CM

## 2024-12-13 RX ORDER — ESCITALOPRAM OXALATE 5 MG/1
5 TABLET ORAL DAILY
Qty: 90 TABLET | Refills: 0 | Status: SHIPPED | OUTPATIENT
Start: 2024-12-13

## 2024-12-13 NOTE — TELEPHONE ENCOUNTER
PCP: Tyler Ortiz APRN - NP    Last appt: 11/11/2024    Future Appointments   Date Time Provider Department Center   5/14/2025  8:00 AM Tyler Ortiz APRN - NP [facility] DEP       Requested Prescriptions     Pending Prescriptions Disp Refills    escitalopram (LEXAPRO) 5 MG tablet [Pharmacy Med Name: ESCITALOPRAM 5 MG TABLET] 90 tablet 0     Sig: TAKE 1 TABLET BY MOUTH EVERY DAY

## 2025-01-24 DIAGNOSIS — F41.0 PANIC DISORDER (EPISODIC PAROXYSMAL ANXIETY): ICD-10-CM

## 2025-01-24 RX ORDER — CLONAZEPAM 0.5 MG/1
TABLET ORAL
Qty: 60 TABLET | Refills: 2 | Status: SHIPPED | OUTPATIENT
Start: 2025-01-24 | End: 2025-04-24

## 2025-01-24 NOTE — TELEPHONE ENCOUNTER
PCP: Tyler Ortiz APRN - NP    Last appt: 11/11/2024    Future Appointments   Date Time Provider Department Center   5/14/2025  8:00 AM Tyler Ortiz APRN - NP [facility] DEP       Requested Prescriptions     Pending Prescriptions Disp Refills    clonazePAM (KLONOPIN) 0.5 MG tablet [Pharmacy Med Name: CLONAZEPAM 0.5 MG TABLET] 60 tablet 2     Sig: TAKE 1 TABLET BY MOUTH TWICE A DAY AS NEEDED FOR ANXIETY FOR UP TO 90 DAYS

## 2025-03-14 DIAGNOSIS — F40.11 GENERALIZED SOCIAL PHOBIA: ICD-10-CM

## 2025-03-14 DIAGNOSIS — F41.0 PANIC DISORDER (EPISODIC PAROXYSMAL ANXIETY): ICD-10-CM

## 2025-03-14 RX ORDER — ESCITALOPRAM OXALATE 5 MG/1
5 TABLET ORAL DAILY
Qty: 90 TABLET | Refills: 0 | Status: SHIPPED | OUTPATIENT
Start: 2025-03-14

## 2025-03-14 NOTE — TELEPHONE ENCOUNTER
PCP: Tyler Ortiz APRN - NP    Last appt: Visit date not found    Future Appointments   Date Time Provider Department Center   5/14/2025  8:00 AM Tyler Ortiz APRN - NP [facility] DEP       Requested Prescriptions     Pending Prescriptions Disp Refills    escitalopram (LEXAPRO) 5 MG tablet [Pharmacy Med Name: ESCITALOPRAM 5 MG TABLET] 90 tablet 0     Sig: TAKE 1 TABLET BY MOUTH EVERY DAY

## 2025-04-15 DIAGNOSIS — F41.0 PANIC DISORDER (EPISODIC PAROXYSMAL ANXIETY): ICD-10-CM

## 2025-04-15 RX ORDER — CLONAZEPAM 0.5 MG/1
TABLET ORAL
Qty: 60 TABLET | Refills: 2 | Status: SHIPPED | OUTPATIENT
Start: 2025-04-15 | End: 2025-04-15 | Stop reason: SDUPTHER

## 2025-04-15 RX ORDER — CLONAZEPAM 0.5 MG/1
0.5 TABLET ORAL 2 TIMES DAILY PRN
Qty: 60 TABLET | Refills: 2 | Status: SHIPPED | OUTPATIENT
Start: 2025-04-15 | End: 2025-07-14

## 2025-04-15 NOTE — TELEPHONE ENCOUNTER
PCP: Tyler Ortiz APRN - NP    Last appt: 11/11/2024    Future Appointments   Date Time Provider Department Center   5/14/2025  8:00 AM Tyler Ortiz APRN - NP [facility] DEP       Requested Prescriptions     Pending Prescriptions Disp Refills    clonazePAM (KLONOPIN) 0.5 MG tablet 60 tablet 2     Sig: Take 1 tablet by mouth 2 times daily as needed for Anxiety for up to 90 days. Max Daily Amount: 1 mg

## 2025-04-15 NOTE — TELEPHONE ENCOUNTER
PCP: Tyler Ortiz APRN - NP    Last appt: 11/11/2024    Future Appointments   Date Time Provider Department Center   5/14/2025  8:00 AM Tyler Ortiz APRN - NP [facility] DE[facility]       Requested Prescriptions     Pending Prescriptions Disp Refills    clonazePAM (KLONOPIN) 0.5 MG tablet [Pharmacy Med Name: CLONAZEPAM 0.5 MG TABLET] 60 tablet 2     Sig: TAKE 1 TABLET BY MOUTH TWICE A DAY AS NEEDED FOR ANXIETY FOR UP TO 90 DAYS

## 2025-04-15 NOTE — TELEPHONE ENCOUNTER
Pt is requesting the pharmacy be changed for the rx  clonazePAM (KLONOPIN) 0.5 MG tablet     [facility] on [address]

## 2025-05-14 ENCOUNTER — OFFICE VISIT (OUTPATIENT)
Facility: CLINIC | Age: 35
End: 2025-05-14
Payer: COMMERCIAL

## 2025-05-14 VITALS
BODY MASS INDEX: 28.09 KG/M2 | HEIGHT: 72 IN | HEART RATE: 77 BPM | RESPIRATION RATE: 16 BRPM | OXYGEN SATURATION: 97 % | WEIGHT: 207.4 LBS | DIASTOLIC BLOOD PRESSURE: 80 MMHG | TEMPERATURE: 98.3 F | SYSTOLIC BLOOD PRESSURE: 122 MMHG

## 2025-05-14 DIAGNOSIS — F33.41 RECURRENT MAJOR DEPRESSIVE DISORDER, IN PARTIAL REMISSION: ICD-10-CM

## 2025-05-14 DIAGNOSIS — Z71.6 ENCOUNTER FOR TOBACCO USE CESSATION COUNSELING: ICD-10-CM

## 2025-05-14 DIAGNOSIS — F41.0 PANIC DISORDER (EPISODIC PAROXYSMAL ANXIETY): ICD-10-CM

## 2025-05-14 DIAGNOSIS — Z00.00 ROUTINE PHYSICAL EXAMINATION: Primary | ICD-10-CM

## 2025-05-14 DIAGNOSIS — F17.210 CIGARETTE NICOTINE DEPENDENCE WITHOUT COMPLICATION: ICD-10-CM

## 2025-05-14 DIAGNOSIS — J30.89 ENVIRONMENTAL AND SEASONAL ALLERGIES: ICD-10-CM

## 2025-05-14 DIAGNOSIS — I10 ESSENTIAL HYPERTENSION: ICD-10-CM

## 2025-05-14 DIAGNOSIS — F40.11 GENERALIZED SOCIAL PHOBIA: ICD-10-CM

## 2025-05-14 DIAGNOSIS — E78.00 PURE HYPERCHOLESTEROLEMIA: ICD-10-CM

## 2025-05-14 DIAGNOSIS — R06.2 WHEEZING: ICD-10-CM

## 2025-05-14 PROCEDURE — 3074F SYST BP LT 130 MM HG: CPT | Performed by: NURSE PRACTITIONER

## 2025-05-14 PROCEDURE — 99395 PREV VISIT EST AGE 18-39: CPT | Performed by: NURSE PRACTITIONER

## 2025-05-14 PROCEDURE — 3079F DIAST BP 80-89 MM HG: CPT | Performed by: NURSE PRACTITIONER

## 2025-05-14 RX ORDER — ESCITALOPRAM OXALATE 5 MG/1
5 TABLET ORAL DAILY
Qty: 90 TABLET | Refills: 1 | Status: SHIPPED | OUTPATIENT
Start: 2025-05-14

## 2025-05-14 RX ORDER — ALBUTEROL SULFATE 90 UG/1
2 INHALANT RESPIRATORY (INHALATION) 4 TIMES DAILY PRN
Qty: 18 EACH | Refills: 1 | Status: SHIPPED | OUTPATIENT
Start: 2025-05-14

## 2025-05-14 RX ORDER — METOPROLOL SUCCINATE 50 MG/1
50 TABLET, EXTENDED RELEASE ORAL DAILY
Qty: 90 TABLET | Refills: 1 | Status: SHIPPED | OUTPATIENT
Start: 2025-05-14

## 2025-05-14 SDOH — ECONOMIC STABILITY: FOOD INSECURITY: WITHIN THE PAST 12 MONTHS, YOU WORRIED THAT YOUR FOOD WOULD RUN OUT BEFORE YOU GOT MONEY TO BUY MORE.: NEVER TRUE

## 2025-05-14 SDOH — ECONOMIC STABILITY: FOOD INSECURITY: WITHIN THE PAST 12 MONTHS, THE FOOD YOU BOUGHT JUST DIDN'T LAST AND YOU DIDN'T HAVE MONEY TO GET MORE.: NEVER TRUE

## 2025-05-14 ASSESSMENT — PATIENT HEALTH QUESTIONNAIRE - PHQ9
7. TROUBLE CONCENTRATING ON THINGS, SUCH AS READING THE NEWSPAPER OR WATCHING TELEVISION: NOT AT ALL
6. FEELING BAD ABOUT YOURSELF - OR THAT YOU ARE A FAILURE OR HAVE LET YOURSELF OR YOUR FAMILY DOWN: NOT AT ALL
2. FEELING DOWN, DEPRESSED OR HOPELESS: NOT AT ALL
SUM OF ALL RESPONSES TO PHQ QUESTIONS 1-9: 0
9. THOUGHTS THAT YOU WOULD BE BETTER OFF DEAD, OR OF HURTING YOURSELF: NOT AT ALL
3. TROUBLE FALLING OR STAYING ASLEEP: NOT AT ALL
SUM OF ALL RESPONSES TO PHQ QUESTIONS 1-9: 0
1. LITTLE INTEREST OR PLEASURE IN DOING THINGS: NOT AT ALL
SUM OF ALL RESPONSES TO PHQ QUESTIONS 1-9: 0
8. MOVING OR SPEAKING SO SLOWLY THAT OTHER PEOPLE COULD HAVE NOTICED. OR THE OPPOSITE, BEING SO FIGETY OR RESTLESS THAT YOU HAVE BEEN MOVING AROUND A LOT MORE THAN USUAL: NOT AT ALL
10. IF YOU CHECKED OFF ANY PROBLEMS, HOW DIFFICULT HAVE THESE PROBLEMS MADE IT FOR YOU TO DO YOUR WORK, TAKE CARE OF THINGS AT HOME, OR GET ALONG WITH OTHER PEOPLE: NOT DIFFICULT AT ALL
4. FEELING TIRED OR HAVING LITTLE ENERGY: NOT AT ALL
SUM OF ALL RESPONSES TO PHQ QUESTIONS 1-9: 0
5. POOR APPETITE OR OVEREATING: NOT AT ALL

## 2025-05-14 NOTE — PROGRESS NOTES
Lefty Johnson is a 35 y.o. male     Chief Complaint   Patient presents with    Annual Exam       /80   Pulse 77   Temp 98.3 °F (36.8 °C) (Oral)   Resp 16   Ht 1.829 m (6')   Wt 94.1 kg (207 lb 6.4 oz)   SpO2 97%   BMI 28.13 kg/m²     Health Maintenance Due   Topic Date Due    Varicella vaccine (1 of 2 - 13+ 2-dose series) Never done    Hepatitis B vaccine (1 of 3 - 19+ 3-dose series) Never done    COVID-19 Vaccine (1 - 2024-25 season) Never done    Depression Monitoring  04/26/2025         \"Have you been to the ER, urgent care clinic since your last visit?  Hospitalized since your last visit?\"    NO    “Have you seen or consulted any other health care providers outside of [facility] since your last visit?”    NO

## 2025-05-14 NOTE — PROGRESS NOTES
Chief Complaint   Patient presents with    Annual Exam       SUBJECTIVE:    Lefty Johnson is a 35 y.o. male who is here today for a routine physical exam as well as follow up appointment regarding current medical conditions including: Essential hypertension, pure hypercholesterolemia, MDD, panic disorder, generalized social phobia, environmental/seasonal allergies, wheezing, and ongoing nicotine dependence and cigarette smoking.    The patient remains on metoprolol succinate 50 mg daily for management of his hypertension.  His blood pressure remained stable and in good control overall.  He denies any adverse side effects of the medication.  He has a history of elevated cholesterol on prior labs, but does not currently take medication for this.  He has been advised to focus on a low-fat/low-cholesterol diet which he has been working on.  He denies any recent episodes of chest pain, chest pressure, shortness of breath, headaches, dizziness, blurred vision, palpitations, or syncope episodes.  He states that dietarily he is trying to make more conscientious decisions about his dietary intake and has reduced his alcohol intake significantly as well.    He remains on a combination of escitalopram and clonazepam for management of his depression as well as anxiety and social phobia.  He continues to respond well to the medication and states that he is doing extremely well overall.  He is happy with the results, and has been more involved in activities outside of home, with his family, and feels that he is capable of interacting more socially than ever before.  He denies any adverse side effects of the medications.    He continues to have issues with seasonal/environmental allergies.  He states he is taking some over-the-counter Zyrtec as needed which has been helpful.  He continues to have some occasional wheezing and uses an albuterol inhaler for this.  He continues to smoke, but states that he has been trying to cut

## 2025-05-15 ENCOUNTER — RESULTS FOLLOW-UP (OUTPATIENT)
Facility: CLINIC | Age: 35
End: 2025-05-15

## 2025-05-15 LAB
ALBUMIN SERPL-MCNC: 4.4 G/DL (ref 3.5–5)
ALBUMIN/GLOB SERPL: 1.3 (ref 1.1–2.2)
ALP SERPL-CCNC: 106 U/L (ref 45–117)
ALT SERPL-CCNC: 32 U/L (ref 12–78)
ANION GAP SERPL CALC-SCNC: 3 MMOL/L (ref 2–12)
AST SERPL-CCNC: 20 U/L (ref 15–37)
BASOPHILS # BLD: 0.05 K/UL (ref 0–0.1)
BASOPHILS NFR BLD: 0.8 % (ref 0–1)
BILIRUB SERPL-MCNC: 0.9 MG/DL (ref 0.2–1)
BUN SERPL-MCNC: 8 MG/DL (ref 6–20)
BUN/CREAT SERPL: 9 (ref 12–20)
CALCIUM SERPL-MCNC: 10.1 MG/DL (ref 8.5–10.1)
CHLORIDE SERPL-SCNC: 103 MMOL/L (ref 97–108)
CHOLEST SERPL-MCNC: 236 MG/DL
CO2 SERPL-SCNC: 31 MMOL/L (ref 21–32)
CREAT SERPL-MCNC: 0.91 MG/DL (ref 0.7–1.3)
DIFFERENTIAL METHOD BLD: ABNORMAL
EOSINOPHIL # BLD: 0.2 K/UL (ref 0–0.4)
EOSINOPHIL NFR BLD: 3.3 % (ref 0–7)
ERYTHROCYTE [DISTWIDTH] IN BLOOD BY AUTOMATED COUNT: 13.7 % (ref 11.5–14.5)
GLOBULIN SER CALC-MCNC: 3.3 G/DL (ref 2–4)
GLUCOSE SERPL-MCNC: 102 MG/DL (ref 65–100)
HCT VFR BLD AUTO: 50.8 % (ref 36.6–50.3)
HDLC SERPL-MCNC: 63 MG/DL
HDLC SERPL: 3.7 (ref 0–5)
HGB BLD-MCNC: 16.9 G/DL (ref 12.1–17)
IMM GRANULOCYTES # BLD AUTO: 0.02 K/UL (ref 0–0.04)
IMM GRANULOCYTES NFR BLD AUTO: 0.3 % (ref 0–0.5)
LDLC SERPL CALC-MCNC: 148.8 MG/DL (ref 0–100)
LYMPHOCYTES # BLD: 1.79 K/UL (ref 0.8–3.5)
LYMPHOCYTES NFR BLD: 29.6 % (ref 12–49)
MCH RBC QN AUTO: 29.3 PG (ref 26–34)
MCHC RBC AUTO-ENTMCNC: 33.3 G/DL (ref 30–36.5)
MCV RBC AUTO: 88.2 FL (ref 80–99)
MONOCYTES # BLD: 0.6 K/UL (ref 0–1)
MONOCYTES NFR BLD: 9.9 % (ref 5–13)
NEUTS SEG # BLD: 3.38 K/UL (ref 1.8–8)
NEUTS SEG NFR BLD: 56.1 % (ref 32–75)
NRBC # BLD: 0 K/UL (ref 0–0.01)
NRBC BLD-RTO: 0 PER 100 WBC
PLATELET # BLD AUTO: 354 K/UL (ref 150–400)
PMV BLD AUTO: 10.4 FL (ref 8.9–12.9)
POTASSIUM SERPL-SCNC: 4.8 MMOL/L (ref 3.5–5.1)
PROT SERPL-MCNC: 7.7 G/DL (ref 6.4–8.2)
RBC # BLD AUTO: 5.76 M/UL (ref 4.1–5.7)
SODIUM SERPL-SCNC: 137 MMOL/L (ref 136–145)
TRIGL SERPL-MCNC: 121 MG/DL
TSH SERPL DL<=0.05 MIU/L-ACNC: 1.55 UIU/ML (ref 0.36–3.74)
VLDLC SERPL CALC-MCNC: 24.2 MG/DL
WBC # BLD AUTO: 6 K/UL (ref 4.1–11.1)

## 2025-06-24 ENCOUNTER — PATIENT MESSAGE (OUTPATIENT)
Facility: CLINIC | Age: 35
End: 2025-06-24

## 2025-06-24 DIAGNOSIS — F41.0 PANIC DISORDER (EPISODIC PAROXYSMAL ANXIETY): ICD-10-CM

## 2025-06-24 DIAGNOSIS — I10 ESSENTIAL HYPERTENSION: ICD-10-CM

## 2025-06-24 DIAGNOSIS — F40.11 GENERALIZED SOCIAL PHOBIA: ICD-10-CM

## 2025-06-24 DIAGNOSIS — R06.2 WHEEZING: ICD-10-CM

## 2025-06-24 RX ORDER — CLONAZEPAM 0.5 MG/1
0.5 TABLET ORAL 2 TIMES DAILY PRN
Qty: 60 TABLET | Refills: 2 | Status: SHIPPED | OUTPATIENT
Start: 2025-06-24 | End: 2025-09-22

## 2025-06-24 RX ORDER — METOPROLOL SUCCINATE 50 MG/1
50 TABLET, EXTENDED RELEASE ORAL DAILY
Qty: 90 TABLET | Refills: 1 | Status: SHIPPED | OUTPATIENT
Start: 2025-06-24

## 2025-06-24 RX ORDER — ESCITALOPRAM OXALATE 5 MG/1
5 TABLET ORAL DAILY
Qty: 90 TABLET | Refills: 1 | Status: SHIPPED | OUTPATIENT
Start: 2025-06-24

## 2025-06-24 RX ORDER — ALBUTEROL SULFATE 90 UG/1
2 INHALANT RESPIRATORY (INHALATION) 4 TIMES DAILY PRN
Qty: 18 EACH | Refills: 1 | Status: SHIPPED | OUTPATIENT
Start: 2025-06-24

## 2025-06-24 NOTE — TELEPHONE ENCOUNTER
Patient requested all of his medication be sent to [facility] in [place] due to current pharmacy closing.    PCP: Tyler Ortiz APRN - NP    Last appt: 5/14/2025    Future Appointments   Date Time Provider Department Center   9/15/2025 10:00 AM Tyler Ortiz APRN - NP [facility] DEP       Requested Prescriptions     Pending Prescriptions Disp Refills    albuterol sulfate HFA (PROVENTIL;VENTOLIN;PROAIR) 108 (90 Base) MCG/ACT inhaler 18 each 1     Sig: Inhale 2 puffs into the lungs 4 times daily as needed for Wheezing    clonazePAM (KLONOPIN) 0.5 MG tablet 60 tablet 2     Sig: Take 1 tablet by mouth 2 times daily as needed for Anxiety for up to 90 days. Max Daily Amount: 1 mg    escitalopram (LEXAPRO) 5 MG tablet 90 tablet 1     Sig: Take 1 tablet by mouth daily    metoprolol succinate (TOPROL XL) 50 MG extended release tablet 90 tablet 1     Sig: Take 1 tablet by mouth daily